# Patient Record
Sex: FEMALE | Race: BLACK OR AFRICAN AMERICAN | URBAN - METROPOLITAN AREA
[De-identification: names, ages, dates, MRNs, and addresses within clinical notes are randomized per-mention and may not be internally consistent; named-entity substitution may affect disease eponyms.]

---

## 2017-05-26 ENCOUNTER — IMPORTED ENCOUNTER (OUTPATIENT)
Dept: URBAN - METROPOLITAN AREA CLINIC 50 | Facility: CLINIC | Age: 52
End: 2017-05-26

## 2018-06-08 ENCOUNTER — IMPORTED ENCOUNTER (OUTPATIENT)
Dept: URBAN - METROPOLITAN AREA CLINIC 50 | Facility: CLINIC | Age: 53
End: 2018-06-08

## 2019-06-07 ENCOUNTER — IMPORTED ENCOUNTER (OUTPATIENT)
Dept: URBAN - METROPOLITAN AREA CLINIC 50 | Facility: CLINIC | Age: 54
End: 2019-06-07

## 2020-09-25 ENCOUNTER — IMPORTED ENCOUNTER (OUTPATIENT)
Dept: URBAN - METROPOLITAN AREA CLINIC 50 | Facility: CLINIC | Age: 55
End: 2020-09-25

## 2021-04-16 ASSESSMENT — VISUAL ACUITY
OD_SC: 20/25
OD_BAT: 20/40
OS_BAT: 20/40
OD_CC: J1
OS_BAT: 20/50
OD_CC: J1@ 21 IN
OD_OTHER: 20/40. 20/80.
OS_PH: 20/30
OD_SC: 20/25
OD_CC: J20
OD_BAT: 20/30
OS_CC: J1
OD_SC: 20/30-2
OS_SC: 20/30-1
OS_CC: J20
OS_OTHER: 20/40. 20/80.
OD_PH: @ 17 IN
OS_OTHER: 20/50. 20/60.
OS_CC: J1@ 21 IN
OS_SC: 20/30-2
OD_SC: 20/30
OS_PH: @ 17 IN
OS_OTHER: 20/50. 20/100.
OS_BAT: 20/50
OD_OTHER: 20/40.
OS_SC: 20/40-2
OD_OTHER: 20/30. 20/400.
OS_SC: 20/40

## 2021-04-16 ASSESSMENT — TONOMETRY
OS_IOP_MMHG: 20
OS_IOP_MMHG: 17
OD_IOP_MMHG: 18
OD_IOP_MMHG: 20
OD_IOP_MMHG: 18
OD_IOP_MMHG: 18
OS_IOP_MMHG: 19
OS_IOP_MMHG: 18

## 2022-02-17 ENCOUNTER — PREPPED CHART (OUTPATIENT)
Dept: URBAN - METROPOLITAN AREA CLINIC 50 | Facility: CLINIC | Age: 57
End: 2022-02-17

## 2022-03-03 ENCOUNTER — COMPREHENSIVE EXAM (OUTPATIENT)
Dept: URBAN - METROPOLITAN AREA CLINIC 49 | Facility: CLINIC | Age: 57
End: 2022-03-03

## 2022-03-03 DIAGNOSIS — Z01.01: ICD-10-CM

## 2022-03-03 DIAGNOSIS — H43.813: ICD-10-CM

## 2022-03-03 DIAGNOSIS — H25.13: ICD-10-CM

## 2022-03-03 PROCEDURE — 92014 COMPRE OPH EXAM EST PT 1/>: CPT

## 2022-03-03 PROCEDURE — 92015 DETERMINE REFRACTIVE STATE: CPT

## 2022-03-03 ASSESSMENT — VISUAL ACUITY
OS_PH: 20/30-2
OS_SC: 20/50
OD_GLARE: 20/25
OD_GLARE: 20/60
OU_CC: J2
OD_SC: 20/25

## 2022-03-03 ASSESSMENT — TONOMETRY
OS_IOP_MMHG: 20
OD_IOP_MMHG: 20

## 2022-10-21 NOTE — PATIENT DISCUSSION
The types of intraocular lenses were reviewed with the patient along with a discussion of their various strengths and weaknesses. within normal limits none

## 2023-10-26 ENCOUNTER — COMPREHENSIVE EXAM (OUTPATIENT)
Dept: URBAN - METROPOLITAN AREA CLINIC 49 | Facility: LOCATION | Age: 58
End: 2023-10-26

## 2023-10-26 DIAGNOSIS — H11.153: ICD-10-CM

## 2023-10-26 DIAGNOSIS — H43.813: ICD-10-CM

## 2023-10-26 DIAGNOSIS — H25.13: ICD-10-CM

## 2023-10-26 DIAGNOSIS — Z01.01: ICD-10-CM

## 2023-10-26 PROCEDURE — 92014 COMPRE OPH EXAM EST PT 1/>: CPT

## 2023-10-26 PROCEDURE — 92015 DETERMINE REFRACTIVE STATE: CPT

## 2023-10-26 ASSESSMENT — VISUAL ACUITY
OD_GLARE: 20/25
OS_CC: 20/25
OS_GLARE: 20/20
OD_GLARE: 20/25
OU_CC: J1+ @ 16"
OD_CC: 20/40
OS_GLARE: 20/20

## 2023-10-26 ASSESSMENT — KERATOMETRY
OD_AXISANGLE2_DEGREES: 90
OD_K2POWER_DIOPTERS: 45.25
OD_AXISANGLE_DEGREES: 180
OS_K2POWER_DIOPTERS: 45.25
OS_AXISANGLE_DEGREES: 159
OS_AXISANGLE2_DEGREES: 69
OS_K1POWER_DIOPTERS: 45.00
OD_K1POWER_DIOPTERS: 44.75

## 2023-10-26 ASSESSMENT — TONOMETRY
OD_IOP_MMHG: 20
OS_IOP_MMHG: 20

## 2024-09-20 ENCOUNTER — EMERGENCY VISIT (OUTPATIENT)
Dept: URBAN - METROPOLITAN AREA CLINIC 49 | Facility: LOCATION | Age: 59
End: 2024-09-20

## 2024-09-20 DIAGNOSIS — H20.011: ICD-10-CM

## 2024-09-20 PROCEDURE — 92012 INTRM OPH EXAM EST PATIENT: CPT

## 2024-10-30 ENCOUNTER — COMPREHENSIVE EXAM (OUTPATIENT)
Dept: URBAN - METROPOLITAN AREA CLINIC 49 | Facility: LOCATION | Age: 59
End: 2024-10-30

## 2024-10-30 DIAGNOSIS — H20.011: ICD-10-CM

## 2024-10-30 DIAGNOSIS — H25.13: ICD-10-CM

## 2024-10-30 DIAGNOSIS — H52.4: ICD-10-CM

## 2024-10-30 DIAGNOSIS — H02.882: ICD-10-CM

## 2024-10-30 DIAGNOSIS — H02.885: ICD-10-CM

## 2024-10-30 DIAGNOSIS — H43.393: ICD-10-CM

## 2024-10-30 DIAGNOSIS — H11.153: ICD-10-CM

## 2024-10-30 PROCEDURE — 99214 OFFICE O/P EST MOD 30 MIN: CPT

## 2024-10-30 PROCEDURE — 92134 CPTRZ OPH DX IMG PST SGM RTA: CPT | Mod: NC

## 2024-10-30 PROCEDURE — 92015 DETERMINE REFRACTIVE STATE: CPT
